# Patient Record
Sex: MALE | Race: WHITE | Employment: UNEMPLOYED | URBAN - METROPOLITAN AREA
[De-identification: names, ages, dates, MRNs, and addresses within clinical notes are randomized per-mention and may not be internally consistent; named-entity substitution may affect disease eponyms.]

---

## 2020-05-20 ENCOUNTER — TELEPHONE (OUTPATIENT)
Dept: ORTHOPEDIC SURGERY | Age: 40
End: 2020-05-20

## 2020-05-20 NOTE — TELEPHONE ENCOUNTER
IT HAS BEEN 4 YEARS SINCE WE HAVE SEEN DELROY, SO I LEFT A MESSAGE THAT HE NEEDS TO MAKE AN APPOINTMENT TO BE SEEN BEFORE WE CAN SCHEDULE HIM FOR SURGERY,